# Patient Record
Sex: FEMALE | Race: WHITE | Employment: FULL TIME | URBAN - METROPOLITAN AREA
[De-identification: names, ages, dates, MRNs, and addresses within clinical notes are randomized per-mention and may not be internally consistent; named-entity substitution may affect disease eponyms.]

---

## 2017-05-17 PROBLEM — N95.1 MENOPAUSAL STATE: Status: ACTIVE | Noted: 2017-05-17

## 2018-09-27 ENCOUNTER — APPOINTMENT (OUTPATIENT)
Dept: GENERAL RADIOLOGY | Facility: HOSPITAL | Age: 53
DRG: 690 | End: 2018-09-27
Attending: EMERGENCY MEDICINE
Payer: COMMERCIAL

## 2018-09-27 ENCOUNTER — HOSPITAL ENCOUNTER (INPATIENT)
Facility: HOSPITAL | Age: 53
LOS: 2 days | Discharge: HOME | DRG: 690 | End: 2018-09-30
Attending: EMERGENCY MEDICINE | Admitting: HOSPITALIST
Payer: COMMERCIAL

## 2018-09-27 DIAGNOSIS — N12 PYELONEPHRITIS: Primary | ICD-10-CM

## 2018-09-27 PROCEDURE — 80053 COMPREHEN METABOLIC PANEL: CPT | Performed by: EMERGENCY MEDICINE

## 2018-09-27 PROCEDURE — 93005 ELECTROCARDIOGRAM TRACING: CPT

## 2018-09-27 PROCEDURE — 87086 URINE CULTURE/COLONY COUNT: CPT | Performed by: EMERGENCY MEDICINE

## 2018-09-27 PROCEDURE — 96361 HYDRATE IV INFUSION ADD-ON: CPT

## 2018-09-27 PROCEDURE — 87186 SC STD MICRODIL/AGAR DIL: CPT | Performed by: EMERGENCY MEDICINE

## 2018-09-27 PROCEDURE — 87040 BLOOD CULTURE FOR BACTERIA: CPT | Performed by: EMERGENCY MEDICINE

## 2018-09-27 PROCEDURE — 87088 URINE BACTERIA CULTURE: CPT | Performed by: EMERGENCY MEDICINE

## 2018-09-27 PROCEDURE — 99285 EMERGENCY DEPT VISIT HI MDM: CPT

## 2018-09-27 PROCEDURE — 85025 COMPLETE CBC W/AUTO DIFF WBC: CPT | Performed by: EMERGENCY MEDICINE

## 2018-09-27 PROCEDURE — 71045 X-RAY EXAM CHEST 1 VIEW: CPT | Performed by: EMERGENCY MEDICINE

## 2018-09-27 PROCEDURE — 81001 URINALYSIS AUTO W/SCOPE: CPT | Performed by: EMERGENCY MEDICINE

## 2018-09-27 PROCEDURE — 96365 THER/PROPH/DIAG IV INF INIT: CPT

## 2018-09-27 PROCEDURE — 36415 COLL VENOUS BLD VENIPUNCTURE: CPT

## 2018-09-27 PROCEDURE — 93010 ELECTROCARDIOGRAM REPORT: CPT

## 2018-09-27 PROCEDURE — 83605 ASSAY OF LACTIC ACID: CPT | Performed by: EMERGENCY MEDICINE

## 2018-09-27 RX ORDER — SODIUM CHLORIDE 9 MG/ML
INJECTION, SOLUTION INTRAVENOUS CONTINUOUS
Status: DISCONTINUED | OUTPATIENT
Start: 2018-09-27 | End: 2018-09-30

## 2018-09-27 RX ORDER — ONDANSETRON 2 MG/ML
4 INJECTION INTRAMUSCULAR; INTRAVENOUS EVERY 4 HOURS PRN
Status: DISCONTINUED | OUTPATIENT
Start: 2018-09-27 | End: 2018-09-29

## 2018-09-27 RX ORDER — ACETAMINOPHEN 325 MG/1
650 TABLET ORAL EVERY 6 HOURS PRN
Status: DISCONTINUED | OUTPATIENT
Start: 2018-09-27 | End: 2018-09-29 | Stop reason: DRUGHIGH

## 2018-09-27 RX ORDER — IBUPROFEN 600 MG/1
600 TABLET ORAL ONCE
Status: COMPLETED | OUTPATIENT
Start: 2018-09-27 | End: 2018-09-27

## 2018-09-27 RX ORDER — HEPARIN SODIUM 5000 [USP'U]/ML
5000 INJECTION, SOLUTION INTRAVENOUS; SUBCUTANEOUS EVERY 8 HOURS SCHEDULED
Status: DISCONTINUED | OUTPATIENT
Start: 2018-09-27 | End: 2018-09-30

## 2018-09-27 RX ORDER — SODIUM CHLORIDE 9 MG/ML
INJECTION, SOLUTION INTRAVENOUS CONTINUOUS
Status: ACTIVE | OUTPATIENT
Start: 2018-09-27 | End: 2018-09-27

## 2018-09-27 NOTE — ED PROVIDER NOTES
Patient Seen in: [facility] Emergency Department    History   Patient presents with:  Fever (infectious)    Stated Complaint: fever,neck stiffness, fatigue x 4 days.  Pcp sent pt to ed to r/o josh    HPI    24-year-old female comes the hospital Smoking status: Former Smoker        Packs/day: 1.00        Years: 5.00        Pack years: 5        Quit date: 1988        Years since quittin.7      Smokeless tobacco: Never Used    Alcohol use:  Yes      Alcohol/week: 0.0 - 0.5 oz      Comment: O Creatinine 1.03 (*)     BUN/CREA Ratio 8.7 (*)     AST 13 (*)     All other components within normal limits   CBC W/ DIFFERENTIAL - Abnormal; Notable for the following components:    Neutrophil Absolute Prelim 8.27 (*)     Neutrophil Absolute 8.27 (*) Stopped 9/27/18 1717)   ibuprofen (MOTRIN) tab 600 mg (600 mg Oral Given 9/27/18 1617)           MDM   Patient's workup is consistent with acute pyelonephritis and the patient was placed in the hospital for observation for control of her symptoms and for I

## 2018-09-28 ENCOUNTER — APPOINTMENT (OUTPATIENT)
Dept: CT IMAGING | Facility: HOSPITAL | Age: 53
DRG: 690 | End: 2018-09-28
Attending: PHYSICIAN ASSISTANT
Payer: COMMERCIAL

## 2018-09-28 PROCEDURE — 74176 CT ABD & PELVIS W/O CONTRAST: CPT | Performed by: PHYSICIAN ASSISTANT

## 2018-09-28 PROCEDURE — 87581 M.PNEUMON DNA AMP PROBE: CPT | Performed by: PHYSICIAN ASSISTANT

## 2018-09-28 PROCEDURE — 87798 DETECT AGENT NOS DNA AMP: CPT | Performed by: PHYSICIAN ASSISTANT

## 2018-09-28 PROCEDURE — 80048 BASIC METABOLIC PNL TOTAL CA: CPT | Performed by: HOSPITALIST

## 2018-09-28 PROCEDURE — 87633 RESP VIRUS 12-25 TARGETS: CPT | Performed by: PHYSICIAN ASSISTANT

## 2018-09-28 PROCEDURE — 85025 COMPLETE CBC W/AUTO DIFF WBC: CPT | Performed by: HOSPITALIST

## 2018-09-28 PROCEDURE — 87486 CHLMYD PNEUM DNA AMP PROBE: CPT | Performed by: PHYSICIAN ASSISTANT

## 2018-09-28 PROCEDURE — 87999 UNLISTED MICROBIOLOGY PX: CPT

## 2018-09-28 RX ORDER — ALFUZOSIN HYDROCHLORIDE 10 MG/1
10 TABLET, EXTENDED RELEASE ORAL
Status: DISCONTINUED | OUTPATIENT
Start: 2018-09-29 | End: 2018-09-30

## 2018-09-28 NOTE — PROGRESS NOTES
NURSING ADMISSION NOTE      Patient admitted via Cart  Oriented to room 508  Safety precautions initiated. Bed in low position. Call light in reach. Pt arrived to [facility] from emergency department. Pt A/O x 4, no distress noted.  Admission database com

## 2018-09-28 NOTE — H&P
[facility]  History & Physical    Dakota Perez Patient Status:  Observation    5/15/1965 MRN [id number]   [facility] 5NW-A Attending León Hough MD   Hosp Day # 0 PCP Bunny Narvaez MD     Cc: fever, neck stiffness    History o Comment:  Cystoscopy, Dr. Rosendo Gray  11/11/2013: ESWL LITHOTRIPSY WITH CYSTOSCOPY, [address] [place] [address],[facility] 5474;  Left      Comment:  Performed by Hattie Guillen MD at [facility]  11/11/2013: FRAGMENTING OF KIDNEY STONE      Comment:  Procedure: LITHOTRIPSY WITH C bilaterally  Neurologic: no focal neurological deficits  Musculoskeletal: moves all 4 extremities, gait deferred   Psychiatric: appropriate mood and affect      Data Review:     Recent Labs   Lab  09/27/18   1607  09/28/18   0616   WBC  10.5  6.8   HGB  15 cultures ntd  Obtained CT scan which shows mod hydronephrosis with mod degree of perinephric fat stranding and a small stone at L UPJ  These findings fit with pyelo as suspected in ER last night.   Will cont cefriaxone, f/u cultures and f/u temp curve  Ask

## 2018-09-28 NOTE — PLAN OF CARE
PAIN - ADULT    • Verbalizes/displays adequate comfort level or patient's stated pain goal Progressing        Patient/Family Goals    • Patient/Family Long Term Goal Progressing    • Patient/Family Short Term Goal Progressing        Received patient A/O x4

## 2018-09-28 NOTE — PAYOR COMM NOTE
--------------  ADMISSION REVIEW     Payor: [address]  Subscriber #:  [id number]  Authorization Number: N/A    Admit date: N/A  Admit time: N/A       Admitting Physician: Anastasiya Goode DO  Attending Physician:  Anastasiya Goode DO  Primary Car CALCULUS,SIMPLE      Comment:  Procedure: LITHOTRIPSY WITH CYSTOSCOPY, STENT PLACEMENT;               Surgeon: Candy Ellis MD;  Location: [address] [place] [address]  9/7/10: CYSTOURETHROSCOPY      Comment:  Cystoscopy, Dr. Kimi Ellis  11/11/2013: ESW cyanosis or edema noted.   Full range of motion noted without tenderness  Neuro: No focal deficits noted    ED Course     Labs Reviewed   URINALYSIS WITH CULTURE REFLEX - Abnormal; Notable for the following components:       Result Value    Clarity Urine Prachi Ratel fever,neck stiffness, fatigue x 4 days. Pcp sent pt to ed to r/o menigitis  PATIENT STATED HISTORY: (As transcribed by Technologist)  Patient has a fever for a few days. FINDINGS:  Cardiac size and pulmonary vasculature are within normal limits.  No pleu 9/27/2018 2116 Given 5000 Units Subcutaneous (Left Lower Abdomen) Ida Cash, RN      ibuprofen (MOTRIN) tab 600 mg     Date Action Dose Route User    9/27/2018 1617 Given 600 mg Oral Yahaira Villasenor RN      0.9%  NaCl infusion     Date Action Dose

## 2018-09-28 NOTE — CM/SW NOTE
09/28/18 1200   CM/SW Screening   Referral Source    Information Source Chart review;Nursing rounds   Patient's Mental Status Alert;Oriented   Patient's [address]   Patient lives with Spouse   Patient Status Prior to Admission

## 2018-09-28 NOTE — PLAN OF CARE
GENITOURINARY - ADULT    • Absence of urinary retention Progressing          PAIN - ADULT    • Verbalizes/displays adequate comfort level or patient's stated pain goal Progressing          PROBLEM: Pyelonephritis     ASSESSMENT: Pt is A&O x4. VSS.  Still sp

## 2018-09-29 ENCOUNTER — ANESTHESIA EVENT (OUTPATIENT)
Dept: SURGERY | Facility: HOSPITAL | Age: 53
End: 2018-09-29

## 2018-09-29 ENCOUNTER — APPOINTMENT (OUTPATIENT)
Dept: GENERAL RADIOLOGY | Facility: HOSPITAL | Age: 53
DRG: 690 | End: 2018-09-29
Attending: UROLOGY
Payer: COMMERCIAL

## 2018-09-29 ENCOUNTER — ANESTHESIA (OUTPATIENT)
Dept: SURGERY | Facility: HOSPITAL | Age: 53
End: 2018-09-29

## 2018-09-29 PROCEDURE — 74420 UROGRAPHY RTRGR +-KUB: CPT | Performed by: UROLOGY

## 2018-09-29 PROCEDURE — BT1F0ZZ FLUOROSCOPY OF LEFT KIDNEY, URETER AND BLADDER USING HIGH OSMOLAR CONTRAST: ICD-10-PCS | Performed by: UROLOGY

## 2018-09-29 PROCEDURE — 85025 COMPLETE CBC W/AUTO DIFF WBC: CPT | Performed by: UROLOGY

## 2018-09-29 PROCEDURE — 87086 URINE CULTURE/COLONY COUNT: CPT | Performed by: UROLOGY

## 2018-09-29 PROCEDURE — 0T778DZ DILATION OF LEFT URETER WITH INTRALUMINAL DEVICE, VIA NATURAL OR ARTIFICIAL OPENING ENDOSCOPIC: ICD-10-PCS | Performed by: UROLOGY

## 2018-09-29 DEVICE — STENT URET 6F 26CM WO GW INL: Type: IMPLANTABLE DEVICE | Site: URETER | Status: FUNCTIONAL

## 2018-09-29 RX ORDER — KETOROLAC TROMETHAMINE 30 MG/ML
15 INJECTION, SOLUTION INTRAMUSCULAR; INTRAVENOUS EVERY 6 HOURS PRN
Status: DISCONTINUED | OUTPATIENT
Start: 2018-09-29 | End: 2018-09-30

## 2018-09-29 RX ORDER — HYDROCODONE BITARTRATE AND ACETAMINOPHEN 5; 325 MG/1; MG/1
1 TABLET ORAL AS NEEDED
Status: DISCONTINUED | OUTPATIENT
Start: 2018-09-29 | End: 2018-09-29 | Stop reason: HOSPADM

## 2018-09-29 RX ORDER — KETOROLAC TROMETHAMINE 30 MG/ML
30 INJECTION, SOLUTION INTRAMUSCULAR; INTRAVENOUS EVERY 6 HOURS PRN
Status: DISCONTINUED | OUTPATIENT
Start: 2018-09-29 | End: 2018-09-30

## 2018-09-29 RX ORDER — NALOXONE HYDROCHLORIDE 0.4 MG/ML
80 INJECTION, SOLUTION INTRAMUSCULAR; INTRAVENOUS; SUBCUTANEOUS AS NEEDED
Status: DISCONTINUED | OUTPATIENT
Start: 2018-09-29 | End: 2018-09-29 | Stop reason: HOSPADM

## 2018-09-29 RX ORDER — ACETAMINOPHEN 500 MG
500 TABLET ORAL EVERY 6 HOURS PRN
Status: DISCONTINUED | OUTPATIENT
Start: 2018-09-29 | End: 2018-09-30

## 2018-09-29 RX ORDER — ACETAMINOPHEN 500 MG
1000 TABLET ORAL ONCE AS NEEDED
Status: DISCONTINUED | OUTPATIENT
Start: 2018-09-29 | End: 2018-09-29 | Stop reason: HOSPADM

## 2018-09-29 RX ORDER — HYDROCODONE BITARTRATE AND ACETAMINOPHEN 5; 325 MG/1; MG/1
2 TABLET ORAL AS NEEDED
Status: DISCONTINUED | OUTPATIENT
Start: 2018-09-29 | End: 2018-09-29 | Stop reason: HOSPADM

## 2018-09-29 RX ORDER — SODIUM CHLORIDE, SODIUM LACTATE, POTASSIUM CHLORIDE, CALCIUM CHLORIDE 600; 310; 30; 20 MG/100ML; MG/100ML; MG/100ML; MG/100ML
INJECTION, SOLUTION INTRAVENOUS CONTINUOUS
Status: DISCONTINUED | OUTPATIENT
Start: 2018-09-29 | End: 2018-09-29 | Stop reason: HOSPADM

## 2018-09-29 RX ORDER — MEPERIDINE HYDROCHLORIDE 25 MG/ML
12.5 INJECTION INTRAMUSCULAR; INTRAVENOUS; SUBCUTANEOUS AS NEEDED
Status: DISCONTINUED | OUTPATIENT
Start: 2018-09-29 | End: 2018-09-29 | Stop reason: HOSPADM

## 2018-09-29 RX ORDER — DIPHENHYDRAMINE HYDROCHLORIDE 50 MG/ML
12.5 INJECTION INTRAMUSCULAR; INTRAVENOUS AS NEEDED
Status: DISCONTINUED | OUTPATIENT
Start: 2018-09-29 | End: 2018-09-29 | Stop reason: HOSPADM

## 2018-09-29 RX ORDER — ONDANSETRON 4 MG/1
4 TABLET, ORALLY DISINTEGRATING ORAL EVERY 6 HOURS PRN
Status: DISCONTINUED | OUTPATIENT
Start: 2018-09-29 | End: 2018-09-30

## 2018-09-29 RX ORDER — ONDANSETRON 2 MG/ML
4 INJECTION INTRAMUSCULAR; INTRAVENOUS EVERY 6 HOURS PRN
Status: DISCONTINUED | OUTPATIENT
Start: 2018-09-29 | End: 2018-09-30

## 2018-09-29 RX ORDER — MORPHINE SULFATE 4 MG/ML
2 INJECTION, SOLUTION INTRAMUSCULAR; INTRAVENOUS EVERY 5 MIN PRN
Status: DISCONTINUED | OUTPATIENT
Start: 2018-09-29 | End: 2018-09-29 | Stop reason: HOSPADM

## 2018-09-29 RX ORDER — ONDANSETRON 2 MG/ML
4 INJECTION INTRAMUSCULAR; INTRAVENOUS AS NEEDED
Status: DISCONTINUED | OUTPATIENT
Start: 2018-09-29 | End: 2018-09-29 | Stop reason: HOSPADM

## 2018-09-29 RX ORDER — PHENAZOPYRIDINE HYDROCHLORIDE 200 MG/1
200 TABLET, FILM COATED ORAL 3 TIMES DAILY PRN
Status: DISCONTINUED | OUTPATIENT
Start: 2018-09-29 | End: 2018-09-30

## 2018-09-29 RX ORDER — ACETAMINOPHEN 500 MG
1000 TABLET ORAL EVERY 6 HOURS PRN
Status: DISCONTINUED | OUTPATIENT
Start: 2018-09-29 | End: 2018-09-30

## 2018-09-29 NOTE — ANESTHESIA PREPROCEDURE EVALUATION
PRE-OP EVALUATION    Patient Name: Cruzita Babinski    Pre-op Diagnosis: Pyelonephritis [N12]    Procedure(s):  CYSTOSCOPY LEFT URETERAL STENT PLACEMENT, RETROGRADE    Surgeon(s) and Role:     * Kristine Vann MD - Primary    Pre-op vitals reviewed.   Temp: 98 Surgical History:  No date:   No date: CHOLECYSTECTOMY  2013: CYSTOSCOPY STENT INSERTION;  Left      Comment:  Performed by Scott Neal MD at [facility] MAIN OR  13: CYSTOSCOPY,INSERT URETERAL STENT      Comment:  left ureteral stone and UTI normal.                 Other findings            ASA: 2   Plan: general  NPO status verified and patient meets guidelines. Post-procedure pain management plan discussed with surgeon and patient.     Comment: Discussed with the patient risk of PONV, sore

## 2018-09-29 NOTE — PROGRESS NOTES
Spoke with Dr. Steve Galvin of urology and notified of this new consult, she will round on the patient this morning.

## 2018-09-29 NOTE — PROGRESS NOTES
Pt is alert and oriented. RA lungs clear, pt just returned from her cysto and stent placement. Rocephin IV no tele, VSS, denies pain.  Will monitor

## 2018-09-29 NOTE — OPERATIVE REPORT
[facility]    PATIENT'S NAME: Bere Vyas   ATTENDING PHYSICIAN: Conecpcion Quevedo M.D. OPERATING PHYSICIAN: Edgar Horton M.D.    PATIENT ACCOUNT#:   [de-identified]    LOCATION:  [address] [place] [address]  MEDICAL RECORD #:   [id number]       DATE OF BIRTH:  [birth year] her perivaginal area was prepped and draped in the usual sterile fashion.  imaging by fluoroscopy could not identify the proximal ureteral stone seen on the CT scan. A 21-Singaporean scope was inserted per urethra.   The urethra was normal, without any st

## 2018-09-29 NOTE — H&P (VIEW-ONLY)
[facility]  Report of Consultation    Isabel Bryant Patient Status:  Inpatient    5/15/1965 MRN [id number]   [facility] 5NW-A Attending Christian Heath MD   Hosp Day # 0 PCP Bernie Coulter MD     Reason for Consultation:  [facility]  9/7/10: CYSTOURETHROSCOPY      Comment:  Cystoscopy, Dr. Yudelka Belle  11/11/2013: ESWL LITHOTRIPSY WITH CYSTOSCOPY, [address] [place] [address],[facility] 54;  Left      Comment:  Performed by Abdirashid Dominique MD at [address] [place] [address]  11/11/2013: [facility] 15 dysuria, hematuria, flank pain, abd pain, added urgency/frequency/urinary incontinence despite UTI diagnosis    Physical Exam:  Tired but NAD  /55 (BP Location: Left arm)   Pulse 66   Temp 98.5 °F (36.9 °C) (Oral)   Resp 16   Ht 5' 6\" (1.676 m)   Wt superimposed pyelonephritis cannot be entirely excluded on imaging. Please correlate clinically. 2. Nonobstructing stones involving bilateral inferior pole calices as described above. 3. The bladder wall is slightly thickened concerning for cystitis.   No lithotripsy would be needed after her infection is under better control      Thank you for allowing me to participate in the care of your patient.     Sandra Perez  9/28/2018  10:28 PM

## 2018-09-29 NOTE — ANESTHESIA POSTPROCEDURE EVALUATION
[facility] Patient Status:  Inpatient   Age/Gender 48year old female MRN [id number]   [facility] SURGERY Attending Norma Montiel MD   [facility] Day # 1 PCP Jaime Rivas MD       Anesthesia Post-op Note    Procedure(s):

## 2018-09-29 NOTE — PLAN OF CARE
GENITOURINARY - ADULT    • Absence of urinary retention Progressing        METABOLIC/FLUID AND ELECTROLYTES - ADULT    • Electrolytes maintained within normal limits Progressing    • Hemodynamic stability and optimal renal function maintained Progressing

## 2018-09-29 NOTE — BRIEF OP NOTE
Pre-Operative Diagnosis: Pyelonephritis [N12], left ureteral stone with hydronephrosis     Post-Operative Diagnosis: Pyelonephritis [N12], left ureteral stone with hydronephrosis     Procedure Performed:   Procedure(s):  CYSTOSCOPY LEFT URETERAL STENT PLAC

## 2018-09-29 NOTE — CONSULTS
[facility]  Report of Consultation    Carolin Ibarra Patient Status:  Inpatient    5/15/1965 MRN [id number]   [facility] 5NW-A Attending Renee Horton MD   Hosp Day # 0 PCP Chip Mathews MD     Reason for Consultation:  [facility]  9/7/10: CYSTOURETHROSCOPY      Comment:  Cystoscopy, Dr. Vandana St  11/11/2013: ESWL LITHOTRIPSY WITH CYSTOSCOPY, [address] [place] [address],[facility] 54;  Left      Comment:  Performed by Leonor Doherty MD at [address] [place] [address]  [facility] 15 dysuria, hematuria, flank pain, abd pain, added urgency/frequency/urinary incontinence despite UTI diagnosis    Physical Exam:  Tired but NAD  /55 (BP Location: Left arm)   Pulse 66   Temp 98.5 °F (36.9 °C) (Oral)   Resp 16   Ht 5' 6\" (1.676 m)   Wt superimposed pyelonephritis cannot be entirely excluded on imaging. Please correlate clinically. 2. Nonobstructing stones involving bilateral inferior pole calices as described above. 3. The bladder wall is slightly thickened concerning for cystitis.   No lithotripsy would be needed after her infection is under better control      Thank you for allowing me to participate in the care of your patient.     Trish Perez  9/28/2018  10:28 PM

## 2018-09-29 NOTE — INTERVAL H&P NOTE
Pre-op Diagnosis: Pyelonephritis [N12]    The above referenced H&P was reviewed by Karlene Pollard MD on 9/29/2018, the patient was examined and no significant changes have occurred in the patient's condition since the H&P was performed.   I discussed with the

## 2018-09-30 VITALS
HEIGHT: 66 IN | SYSTOLIC BLOOD PRESSURE: 111 MMHG | WEIGHT: 179.88 LBS | TEMPERATURE: 98 F | RESPIRATION RATE: 18 BRPM | DIASTOLIC BLOOD PRESSURE: 58 MMHG | HEART RATE: 63 BPM | OXYGEN SATURATION: 97 % | BODY MASS INDEX: 28.91 KG/M2

## 2018-09-30 PROBLEM — N13.2 URETERAL STONE WITH HYDRONEPHROSIS: Status: ACTIVE | Noted: 2018-09-30

## 2018-09-30 RX ORDER — PSEUDOEPHEDRINE HCL 30 MG
100 TABLET ORAL 2 TIMES DAILY PRN
Qty: 30 CAPSULE | Refills: 0 | Status: SHIPPED | OUTPATIENT
Start: 2018-09-30 | End: 2018-09-30

## 2018-09-30 RX ORDER — CEPHALEXIN 500 MG/1
500 CAPSULE ORAL 2 TIMES DAILY
Qty: 60 CAPSULE | Refills: 0 | Status: SHIPPED | OUTPATIENT
Start: 2018-09-30 | End: 2018-09-30

## 2018-09-30 RX ORDER — TAMSULOSIN HYDROCHLORIDE 0.4 MG/1
0.4 CAPSULE ORAL DAILY
Qty: 30 CAPSULE | Refills: 0 | Status: SHIPPED | OUTPATIENT
Start: 2018-09-30 | End: 2018-09-30

## 2018-09-30 RX ORDER — CEPHALEXIN 500 MG/1
500 CAPSULE ORAL 3 TIMES DAILY
Qty: 60 CAPSULE | Refills: 0 | Status: SHIPPED | OUTPATIENT
Start: 2018-09-30 | End: 2018-09-30

## 2018-09-30 RX ORDER — ACETAMINOPHEN 500 MG
TABLET ORAL EVERY 6 HOURS PRN
Qty: 30 TABLET | Refills: 0 | Status: SHIPPED | COMMUNITY
Start: 2018-09-30 | End: 2018-10-17 | Stop reason: ALTCHOICE

## 2018-09-30 RX ORDER — DOCUSATE SODIUM 100 MG/1
100 CAPSULE, LIQUID FILLED ORAL 2 TIMES DAILY
Status: DISCONTINUED | OUTPATIENT
Start: 2018-09-30 | End: 2018-09-30

## 2018-09-30 RX ORDER — CEPHALEXIN 500 MG/1
500 CAPSULE ORAL 3 TIMES DAILY
Qty: 60 CAPSULE | Refills: 0 | Status: SHIPPED | OUTPATIENT
Start: 2018-09-30 | End: 2018-10-11

## 2018-09-30 RX ORDER — TAMSULOSIN HYDROCHLORIDE 0.4 MG/1
0.4 CAPSULE ORAL DAILY
Qty: 30 CAPSULE | Refills: 0 | Status: SHIPPED | OUTPATIENT
Start: 2018-09-30 | End: 2018-10-17 | Stop reason: ALTCHOICE

## 2018-09-30 RX ORDER — PHENAZOPYRIDINE HYDROCHLORIDE 200 MG/1
200 TABLET, FILM COATED ORAL 3 TIMES DAILY PRN
Qty: 30 TABLET | Refills: 0 | Status: SHIPPED | OUTPATIENT
Start: 2018-09-30 | End: 2018-10-17 | Stop reason: ALTCHOICE

## 2018-09-30 RX ORDER — PSEUDOEPHEDRINE HCL 30 MG
100 TABLET ORAL 2 TIMES DAILY PRN
Qty: 30 CAPSULE | Refills: 0 | Status: SHIPPED | OUTPATIENT
Start: 2018-09-30 | End: 2018-10-17 | Stop reason: ALTCHOICE

## 2018-09-30 RX ORDER — PHENAZOPYRIDINE HYDROCHLORIDE 200 MG/1
200 TABLET, FILM COATED ORAL 3 TIMES DAILY PRN
Qty: 30 TABLET | Refills: 0 | Status: SHIPPED | OUTPATIENT
Start: 2018-09-30 | End: 2018-09-30

## 2018-09-30 NOTE — PROGRESS NOTES
[facility]  Progress Note    Asa Santos Patient Status:  Inpatient    5/15/1965 MRN [id number]   [facility] 5NW-A Attending Palomo Taylor MD   Hosp Day # 2 PCP Tiana Allison MD     Subjective:  Asa Santos is a(n) 48 year Pyelonephritis      Ureter stone with hydronephrosis s/p ureter stent placement POD1    Plan:  Ok to dc home on oral abx with future FU ureteroscopy in 1-2 weeks  Rx for keflex - reminded patient to remain on treatment abx until stone is treated and stent

## 2018-09-30 NOTE — PROGRESS NOTES
[facility] Hospitalist note    PCP: Nguyễn Barahona MD    Chief Complaint:  F/u uti, upj stone    SUBJECTIVE:  Feeling much better today  Afebrile  No sig pain  ambulatory    OBJECTIVE:  Temp:  [97.6 °F (36.4 °C)-98.9 °F (37.2 °C)] 97.6 °F (36.4 °C)  Pulse: Phenazopyridine HCl, ondansetron **OR** ondansetron HCl, influenza virus vaccine PF       Assessment/Plan:     # Fever, E Coli UTI with Pyelonephritis; UPJ stone  - doing well on ceftriaxone.  Likely can d/c home on keflex based on susceptibilities  - CT ha

## 2018-09-30 NOTE — PROGRESS NOTES
Pt is alert and oriented x 4. Denies pain states with stent she feels like she has a full bladder , so she didn't sleep well because of that feeling. RA lungs clear. Rocephin   Q 24.  IVF infusing,

## 2018-09-30 NOTE — DISCHARGE SUMMARY
General Medicine Discharge Summary     Patient ID:  Melanie Nascimento  48year old  5/15/1965    Admit date: 9/27/2018    Discharge date and time: 9/30/2018  3:08 PM     Attending Physician: No att. provider is contrast in the left intrarenal collecting system and proximal left ureter. 2nd image demonstrates a left ureteral stent in place.      Dictated by: Myra Sam MD on 9/29/2018 at 8:48     Approved by: Myra Sam MD            Ct Abdomen+pelvis K obstruction, or bowel wall thickening. ABDOMINAL WALL:  No mass or hernia. BONES:  No acute fracture. There is extensive disc disease at L4-5 with vacuum disc change. There is bilateral L5 spondylolysis without anterolisthesis.  PELVIC ORGANS:  The blad Procedure(s) (LRB):  CYSTOSCOPY (Left)       Patient instructions:      Discharge Medication List as of 9/30/2018  1:48 PM    START taking these medications    acetaminophen 500 MG Oral Tab  Take 1-2 tablets (500-1,000 mg total) by mouth every 6 (six) hour

## 2018-10-03 NOTE — PAYOR COMM NOTE
--------------  DISCHARGE REVIEW    Payor: [facility]  Subscriber #:  [id number]  Authorization Number: [id number]    Admit date: 9/28/18  Admit time:  1430  Discharge Date: 9/30/2018  3:08 PM     Admitting Physician: DO Mina Hartman per urology, apprec recs    Consults: IP CONSULT TO James Peter    Radiology reports:    Xr Retrograde Pyelogram (cpt=74420)  Result Date: 9/29/2018  CONCLUSION:  Fluoroscopy provided intraoperatively by a technologist.  2 images obtained.   There is contrast i constipation. , Normal, Disp-30 capsule, R-0    cephALEXin (KEFLEX) 500 MG Oral Cap  Take 1 capsule (500 mg total) by mouth 3 (three) times daily for 20 days. , Normal, Disp-60 capsule, R-0    Code Status: Full Code    Exam on day of discharge:      09/30/18

## 2018-10-11 PROCEDURE — 82365 CALCULUS SPECTROSCOPY: CPT | Performed by: UROLOGY

## 2018-10-17 PROCEDURE — 83970 ASSAY OF PARATHORMONE: CPT | Performed by: UROLOGY

## 2018-10-17 PROCEDURE — 84100 ASSAY OF PHOSPHORUS: CPT | Performed by: UROLOGY

## 2018-10-17 PROCEDURE — 82310 ASSAY OF CALCIUM: CPT | Performed by: UROLOGY

## 2018-10-17 PROCEDURE — 82565 ASSAY OF CREATININE: CPT | Performed by: UROLOGY

## 2018-11-12 PROCEDURE — 83945 ASSAY OF OXALATE: CPT | Performed by: UROLOGY

## 2018-11-12 PROCEDURE — 82436 ASSAY OF URINE CHLORIDE: CPT | Performed by: UROLOGY

## 2018-11-12 PROCEDURE — 82507 ASSAY OF CITRATE: CPT | Performed by: UROLOGY

## 2018-11-12 PROCEDURE — 84392 ASSAY OF URINE SULFATE: CPT | Performed by: UROLOGY

## 2018-11-12 PROCEDURE — 81003 URINALYSIS AUTO W/O SCOPE: CPT | Performed by: UROLOGY

## 2018-11-12 PROCEDURE — 82340 ASSAY OF CALCIUM IN URINE: CPT | Performed by: UROLOGY

## 2019-11-06 PROBLEM — S83.512D COMPLETE TEAR OF ANTERIOR CRUCIATE LIGAMENT OF LEFT KNEE, SUBSEQUENT ENCOUNTER: Status: ACTIVE | Noted: 2019-11-06

## 2019-11-22 PROBLEM — M25.562 ACUTE PAIN OF LEFT KNEE: Status: ACTIVE | Noted: 2019-11-22

## 2019-11-22 PROBLEM — S83.512D RUPTURE OF ANTERIOR CRUCIATE LIGAMENT OF LEFT KNEE, SUBSEQUENT ENCOUNTER: Status: ACTIVE | Noted: 2019-11-22

## 2019-12-03 PROBLEM — Z47.89 ORTHOPEDIC AFTERCARE: Status: ACTIVE | Noted: 2019-12-03

## 2022-01-08 ENCOUNTER — APPOINTMENT (OUTPATIENT)
Dept: GENERAL RADIOLOGY | Facility: HOSPITAL | Age: 57
End: 2022-01-08
Attending: EMERGENCY MEDICINE
Payer: COMMERCIAL

## 2022-01-08 ENCOUNTER — HOSPITAL ENCOUNTER (EMERGENCY)
Facility: HOSPITAL | Age: 57
Discharge: HOME | End: 2022-01-08
Payer: COMMERCIAL

## 2022-01-08 ENCOUNTER — APPOINTMENT (OUTPATIENT)
Dept: GENERAL RADIOLOGY | Facility: HOSPITAL | Age: 57
End: 2022-01-08
Payer: COMMERCIAL

## 2022-01-08 VITALS
RESPIRATION RATE: 18 BRPM | HEART RATE: 76 BPM | DIASTOLIC BLOOD PRESSURE: 65 MMHG | SYSTOLIC BLOOD PRESSURE: 140 MMHG | TEMPERATURE: 98 F | OXYGEN SATURATION: 99 %

## 2022-01-08 DIAGNOSIS — S62.102A CLOSED FRACTURE OF LEFT WRIST, INITIAL ENCOUNTER: Primary | ICD-10-CM

## 2022-01-08 PROCEDURE — 73110 X-RAY EXAM OF WRIST: CPT

## 2022-01-08 PROCEDURE — 73100 X-RAY EXAM OF WRIST: CPT | Performed by: EMERGENCY MEDICINE

## 2022-01-08 PROCEDURE — 25605 CLTX DST RDL FX/EPHYS SEP W/: CPT

## 2022-01-08 PROCEDURE — 99285 EMERGENCY DEPT VISIT HI MDM: CPT

## 2022-01-08 PROCEDURE — 96374 THER/PROPH/DIAG INJ IV PUSH: CPT

## 2022-01-08 PROCEDURE — 73030 X-RAY EXAM OF SHOULDER: CPT

## 2022-01-08 PROCEDURE — 96375 TX/PRO/DX INJ NEW DRUG ADDON: CPT

## 2022-01-08 RX ORDER — HYDROCODONE BITARTRATE AND ACETAMINOPHEN 5; 325 MG/1; MG/1
1-2 TABLET ORAL EVERY 6 HOURS PRN
Qty: 20 TABLET | Refills: 0 | Status: SHIPPED | OUTPATIENT
Start: 2022-01-08 | End: 2022-01-18

## 2022-01-08 RX ORDER — HYDROMORPHONE HYDROCHLORIDE 1 MG/ML
1 INJECTION, SOLUTION INTRAMUSCULAR; INTRAVENOUS; SUBCUTANEOUS EVERY 30 MIN PRN
Status: DISCONTINUED | OUTPATIENT
Start: 2022-01-08 | End: 2022-01-09

## 2022-01-08 RX ORDER — MIDAZOLAM HYDROCHLORIDE 1 MG/ML
2 INJECTION INTRAMUSCULAR; INTRAVENOUS ONCE
Status: COMPLETED | OUTPATIENT
Start: 2022-01-08 | End: 2022-01-08

## 2022-01-08 NOTE — ED INITIAL ASSESSMENT (HPI)
Pt here with a positive wrist deformity after falling on the ice. Left wrist deformed. Left shoulder pain also. Ice and sling applied. +CMS. Pt hit her had also. No loc.

## 2022-01-09 NOTE — ED PROVIDER NOTES
Patient Seen in: [facility] Emergency Department      History   Patient presents with:  Arm or Hand Injury    Stated Complaint: Fall, +hit head, denies loc. L wrist pain and L shoulder pain     Subjective:   HPI    Patient had a slip and fall.   She Alcohol/week: 0.0 - 0.8 standard drinks      Comment: OCC    Drug use: No             Review of Systems    Positive for stated complaint: Fall, +hit head, denies loc. L wrist pain and L shoulder pain   Other systems are as noted in HPI.   Constitutional and rotation is demonstrated. Left glenohumeral joint space is well maintained. Minimal hypertrophic changes of the left acromioclavicular joint.    IMPRESSION:   No fracture or dislocation.        A hematoma block was performed with 5 cc lidocaine without Plan     Clinical Impression:  Closed fracture of left wrist, initial encounter  (primary encounter diagnosis)     Disposition:  Discharge  1/8/2022  9:26 pm    Follow-up:  [place], [address] [phone]

## 2022-01-09 NOTE — ED QUICK NOTES
FELL. SUSTAINED PAIN/DEFORMITY TO LEFT WRIST/FOREARM. UNSURE IF SHE HIT HEAD. DENIES LOC. CAP REFILL<2SEC. DENIES NECK PAIN, DENIES ANY OTHER INJURIES.

## 2024-04-10 ENCOUNTER — APPOINTMENT (OUTPATIENT)
Dept: CT IMAGING | Age: 59
End: 2024-04-10
Attending: EMERGENCY MEDICINE
Payer: COMMERCIAL

## 2024-04-10 ENCOUNTER — HOSPITAL ENCOUNTER (EMERGENCY)
Age: 59
Discharge: HOME | End: 2024-04-10
Attending: EMERGENCY MEDICINE
Payer: COMMERCIAL

## 2024-04-10 ENCOUNTER — APPOINTMENT (OUTPATIENT)
Dept: GENERAL RADIOLOGY | Age: 59
End: 2024-04-10
Attending: EMERGENCY MEDICINE
Payer: COMMERCIAL

## 2024-04-10 VITALS
HEART RATE: 67 BPM | RESPIRATION RATE: 18 BRPM | WEIGHT: 210 LBS | DIASTOLIC BLOOD PRESSURE: 69 MMHG | SYSTOLIC BLOOD PRESSURE: 142 MMHG | TEMPERATURE: 100 F | BODY MASS INDEX: 33.75 KG/M2 | HEIGHT: 66 IN | OXYGEN SATURATION: 97 %

## 2024-04-10 DIAGNOSIS — M54.6 ACUTE LEFT-SIDED THORACIC BACK PAIN: Primary | ICD-10-CM

## 2024-04-10 DIAGNOSIS — R07.89 CHEST PAIN, NON-CARDIAC: ICD-10-CM

## 2024-04-10 LAB
ALBUMIN SERPL-MCNC: 3.8 G/DL (ref 3.4–5)
ALBUMIN/GLOB SERPL: 1.2 {RATIO} (ref 1–2)
ALP LIVER SERPL-CCNC: 98 U/L
ALT SERPL-CCNC: 38 U/L
ANION GAP SERPL CALC-SCNC: 3 MMOL/L (ref 0–18)
APTT PPP: 27.5 SECONDS (ref 23.3–35.6)
AST SERPL-CCNC: 21 U/L (ref 15–37)
ATRIAL RATE: 70 BPM
BASOPHILS # BLD AUTO: 0.03 X10(3) UL (ref 0–0.2)
BASOPHILS NFR BLD AUTO: 0.4 %
BILIRUB SERPL-MCNC: 0.3 MG/DL (ref 0.1–2)
BUN BLD-MCNC: 17 MG/DL (ref 9–23)
CALCIUM BLD-MCNC: 8.8 MG/DL (ref 8.5–10.1)
CHLORIDE SERPL-SCNC: 109 MMOL/L (ref 98–112)
CO2 SERPL-SCNC: 26 MMOL/L (ref 21–32)
CREAT BLD-MCNC: 0.88 MG/DL
D DIMER PPP FEU-MCNC: 0.32 UG/ML FEU (ref ?–0.58)
EGFRCR SERPLBLD CKD-EPI 2021: 76 ML/MIN/1.73M2 (ref 60–?)
EOSINOPHIL # BLD AUTO: 0.11 X10(3) UL (ref 0–0.7)
EOSINOPHIL NFR BLD AUTO: 1.5 %
ERYTHROCYTE [DISTWIDTH] IN BLOOD BY AUTOMATED COUNT: 13.1 %
GLOBULIN PLAS-MCNC: 3.3 G/DL (ref 2.8–4.4)
GLUCOSE BLD-MCNC: 121 MG/DL (ref 70–99)
HCT VFR BLD AUTO: 44.1 %
HGB BLD-MCNC: 14.8 G/DL
IMM GRANULOCYTES # BLD AUTO: 0.02 X10(3) UL (ref 0–1)
IMM GRANULOCYTES NFR BLD: 0.3 %
INR BLD: 0.93 (ref 0.8–1.2)
LIPASE SERPL-CCNC: 47 U/L (ref 13–75)
LYMPHOCYTES # BLD AUTO: 1.2 X10(3) UL (ref 1–4)
LYMPHOCYTES NFR BLD AUTO: 16.4 %
MCH RBC QN AUTO: 31 PG (ref 26–34)
MCHC RBC AUTO-ENTMCNC: 33.6 G/DL (ref 31–37)
MCV RBC AUTO: 92.3 FL
MONOCYTES # BLD AUTO: 0.7 X10(3) UL (ref 0.1–1)
MONOCYTES NFR BLD AUTO: 9.6 %
NEUTROPHILS # BLD AUTO: 5.25 X10 (3) UL (ref 1.5–7.7)
NEUTROPHILS # BLD AUTO: 5.25 X10(3) UL (ref 1.5–7.7)
NEUTROPHILS NFR BLD AUTO: 71.8 %
OSMOLALITY SERPL CALC.SUM OF ELEC: 289 MOSM/KG (ref 275–295)
P AXIS: 36 DEGREES
P-R INTERVAL: 154 MS
PLATELET # BLD AUTO: 146 10(3)UL (ref 150–450)
POTASSIUM SERPL-SCNC: 3.9 MMOL/L (ref 3.5–5.1)
PROT SERPL-MCNC: 7.1 G/DL (ref 6.4–8.2)
PROTHROMBIN TIME: 12.5 SECONDS (ref 11.6–14.8)
Q-T INTERVAL: 386 MS
QRS DURATION: 86 MS
QTC CALCULATION (BEZET): 416 MS
R AXIS: 4 DEGREES
RBC # BLD AUTO: 4.78 X10(6)UL
SODIUM SERPL-SCNC: 138 MMOL/L (ref 136–145)
T AXIS: 3 DEGREES
TROPONIN I SERPL HS-MCNC: 6 NG/L
TROPONIN I SERPL HS-MCNC: 9 NG/L
VENTRICULAR RATE: 70 BPM
WBC # BLD AUTO: 7.3 X10(3) UL (ref 4–11)

## 2024-04-10 PROCEDURE — 71045 X-RAY EXAM CHEST 1 VIEW: CPT | Performed by: EMERGENCY MEDICINE

## 2024-04-10 PROCEDURE — 80053 COMPREHEN METABOLIC PANEL: CPT | Performed by: EMERGENCY MEDICINE

## 2024-04-10 PROCEDURE — 85379 FIBRIN DEGRADATION QUANT: CPT | Performed by: EMERGENCY MEDICINE

## 2024-04-10 PROCEDURE — 71275 CT ANGIOGRAPHY CHEST: CPT | Performed by: EMERGENCY MEDICINE

## 2024-04-10 PROCEDURE — 96374 THER/PROPH/DIAG INJ IV PUSH: CPT

## 2024-04-10 PROCEDURE — 85730 THROMBOPLASTIN TIME PARTIAL: CPT | Performed by: EMERGENCY MEDICINE

## 2024-04-10 PROCEDURE — 99285 EMERGENCY DEPT VISIT HI MDM: CPT

## 2024-04-10 PROCEDURE — 74175 CTA ABDOMEN W/CONTRAST: CPT | Performed by: EMERGENCY MEDICINE

## 2024-04-10 PROCEDURE — 83690 ASSAY OF LIPASE: CPT | Performed by: EMERGENCY MEDICINE

## 2024-04-10 PROCEDURE — 85610 PROTHROMBIN TIME: CPT | Performed by: EMERGENCY MEDICINE

## 2024-04-10 PROCEDURE — 84484 ASSAY OF TROPONIN QUANT: CPT | Performed by: EMERGENCY MEDICINE

## 2024-04-10 PROCEDURE — 93005 ELECTROCARDIOGRAM TRACING: CPT

## 2024-04-10 PROCEDURE — 85025 COMPLETE CBC W/AUTO DIFF WBC: CPT | Performed by: EMERGENCY MEDICINE

## 2024-04-10 PROCEDURE — 93010 ELECTROCARDIOGRAM REPORT: CPT

## 2024-04-10 RX ORDER — KETOROLAC TROMETHAMINE 30 MG/ML
30 INJECTION, SOLUTION INTRAMUSCULAR; INTRAVENOUS ONCE
Status: COMPLETED | OUTPATIENT
Start: 2024-04-10 | End: 2024-04-10

## 2024-04-10 RX ORDER — TRAMADOL HYDROCHLORIDE 50 MG/1
50 TABLET ORAL EVERY 8 HOURS PRN
Qty: 10 TABLET | Refills: 0 | Status: SHIPPED | OUTPATIENT
Start: 2024-04-10

## 2024-04-10 NOTE — ED PROVIDER NOTES
Patient Seen in: [facility] In [place]      History     Chief Complaint   Patient presents with    Chest Pain Angina     Stated Complaint: left sided chest pain radiates to back with sob    Subjective:   HPI    Patient is a 58-year-old female presents emergency room with a history of having left upper back pain near her left shoulder blade which started on Monday, 2 days prior to arrival in the emergency room.  The patient's pain is now radiating around to the left side of her chest.  The patient states she saw chiropractor for the symptoms on Monday and admitted relation time with minimal improvement.  The patient denies history of any fall or trauma.  The patient's pain is sharp in nature and is worse with movement or deep breathing.  The patient denies history of any blood clots in the past.  The patient states she once had a \"rib out of place\" in the past.  The patient denies history of any other somatic complaints or discomfort at this time.    Objective:   Past Medical History:    Abnormal uterine bleeding    normal sonohysterogram    Amenorrhea    Anemia    Depression    KIDNEY STONE    MITRAL VALVE PROLAPSE    PONV (postoperative nausea and vomiting)              Past Surgical History:   Procedure Laterality Date          Cholecystectomy      Colonoscopy      Cysto/uretero, stone remove Left 10/11/2018    URS stone extraction stent replacement with string, [facility]    Cystoscopy,insert ureteral stent  13    left ureteral stone and UTI - [facility] [place]    Cystoscopy,insert ureteral stent Left 2018    ureteral stone with hydronephrosis and UTI    Cystoscopy,remv calculus,simple  2013    Procedure: LITHOTRIPSY WITH CYSTOSCOPY, STENT PLACEMENT;  Surgeon: Mike Perez MD;  [place] [facility]    Cystourethroscopy  9/7/10    Cystoscopy, Dr. Perez    Fragmenting of kidney stone  2013    Procedure: LITHOTRIPSY WITH CYSTOSCOPY, STENT PLACEMENT;  Surgeon:  Mike Perez MD;  Location: [facility] [place], [facility]                Social History     Socioeconomic History    Marital status:    Tobacco Use    Smoking status: Former     Current packs/day: 0.00     Average packs/day: 1 pack/day for 5.0 years (5.0 ttl pk-yrs)     Types: Cigarettes     Start date: 1983     Quit date: 1988     Years since quittin.2    Smokeless tobacco: Never   Vaping Use    Vaping status: Never Used   Substance and Sexual Activity    Alcohol use: Yes     Alcohol/week: 0.0 - 0.8 standard drinks of alcohol     Comment: OCC    Drug use: No    Sexual activity: Yes     Partners: Male   Other Topics Concern    Caffeine Concern Yes     Comment: 3 cups    Exercise Yes     Comment: 3 x a week.cardio,weights              Review of Systems    Positive for stated complaint: left sided chest pain radiates to back with sob  Other systems are as noted in HPI.  Constitutional and vital signs reviewed.      All other systems reviewed and negative except as noted above.    Physical Exam     ED Triage Vitals   BP 04/10/24 0822 (!) 161/72   Pulse 04/10/24 0822 81   Resp 04/10/24 0822 21   Temp 04/10/24 0825 99.5 °F (37.5 °C)   Temp src 04/10/24 0825 Temporal   SpO2 04/10/24 0822 98 %   O2 Device 04/10/24 0822 None (Room air)       Current:/69   Pulse 67   Temp 99.5 °F (37.5 °C) (Temporal)   Resp 18   Ht 167.6 cm (5' 6\")   Wt 95.3 kg   LMP 2014   SpO2 97%   BMI 33.89 kg/m²         Physical Exam    GENERAL: Well-developed, well-nourished female sitting up breathing easily in no apparent distress.  Patient is nontoxic in appearance.  HEENT: Head is normocephalic, atraumatic. Pupils are 4 mm equally round and reactive to light. Oropharynx is clear. Mucous membranes are moist.  NECK: There is no focal tenderness to palpation appreciated.   LUNGS: Clear to auscultation bilaterally with no wheeze. There is good equal air entry bilaterally.  HEART: Regular rate and rhythm. Normal S1, S2  no S3, or S4. No murmur.  ABDOMEN: There is no focal tenderness to palpation appreciated anywhere throughout the abdomen. There is no guarding, no rebound, no mass, and no organomegaly appreciated. There is normoactive bowel sounds.  BACK: There is paraspinal tenderness to palpation in the area of the left thoracic area which reproduces the patient's symptoms with no focal midline tenderness palpation throughout the thoracic or lumbar spinous processes.  There is no overlying ecchymosis or rash appreciated.  EXTREMITIES: There is no cyanosis, clubbing, or edema appreciated. Pulses are 2+ and equal in all 4 extremities.  NEURO: Patient is awake, alert and oriented to time place and person. Motor strength is 5 over 5 in all 4 extremities. There are no gross motor or sensory deficits appreciated.  Patient answering all questions appropriately.        ED Course     Labs Reviewed   COMP METABOLIC PANEL (14) - Abnormal; Notable for the following components:       Result Value    Glucose 121 (*)     All other components within normal limits   CBC W/ DIFFERENTIAL - Abnormal; Notable for the following components:    .0 (*)     All other components within normal limits   LIPASE - Normal   TROPONIN I HIGH SENSITIVITY - Normal   D-DIMER - Normal   PROTHROMBIN TIME (PT) - Normal   PTT, ACTIVATED - Normal   TROPONIN I HIGH SENSITIVITY - Normal   CBC WITH DIFFERENTIAL WITH PLATELET    Narrative:     The following orders were created for panel order CBC With Differential With Platelet.  Procedure                               Abnormality         Status                     ---------                               -----------         ------                     CBC W/ DIFFERENTIAL[[id number]]          Abnormal            Final result                 Please view results for these tests on the individual orders.   RAINBOW DRAW LAVENDER   RAINBOW DRAW LIGHT GREEN     EKG    Rate, intervals and axes as noted on EKG Report.  Rate:  70  Rhythm: Sinus Rhythm  Reading: No acute ischemic change noted         I personally interpreted the patient's chest x-ray images and my individual interpretation shows no evidence of any acute infiltrate or pneumothorax.  I also reviewed the official radiology report which showed results as noted below.        CTA CHEST+CTA ABDOMEN DISSECT SET (CPT=71275/86384)    Result Date: 4/10/2024  CONCLUSION:  1. No evidence for an aortic dissection as clinically questioned. 2. Bibasilar atelectasis or early consolidative process, left greater than right, correlate clinically. 3. Bilateral nonobstructing renal calcifications.    LOCATION:  [facility]    Dictated by (CST): Ambreen Gordon MD on 4/10/2024 at 11:26 AM     Finalized by (CST): Ambreen Gordon MD on 4/10/2024 at 11:34 AM       XR CHEST AP PORTABLE  (CPT=71045)    Result Date: 4/10/2024  CONCLUSION:  Mild prominence of interstitial and pulmonary vascular markings in lungs are noted.  This could be technical although mild vascular congestion is not excluded.   LOCATION:  [facility]      Dictated by (CST): Bal Heath MD on 4/10/2024 at 8:51 AM     Finalized by (CST): Bal Heath MD on 4/10/2024 at 8:53 AM               MDM          9:37 patient sitting back and breathing easily in no apparent distress this time.  Patient with no complaints of any chest pain at this time.  Will continue to observe at this time.  10:59 patient sitting back up breathing easily in no apparent distress this time.  Patient denies any chest pain at this time.  Patient still complaining of some discomfort in her left scapular area only at this time.  Awaiting CT results at this time.  Will continue to observe at this time.  12:19 patient sitting back and breathing easily in no apparent distress this time.  Patient with no complaints of any chest pain.  Patient feels comfortable driving herself directly to the cardiology office and knows to go directly the cardiology office for evaluation at this time as  per discussion with Dr. Hurley.  The patient will be seen over at the cardiology office immediately this afternoon.    Patient an IV line established blood work drawn including a CBC, chemistries, BUN and creatinine, and blood sugar all of which are unremarkable.  Liver function test found to be negative.  Troponin and repeat troponin are both negative.  Lipase found to be negative.  D-dimer found to be negative.  Coags are unremarkable.  Patient was placed on a continuous pulse ox and cardiac monitor.  Patient sitting back and breathing easily in no apparent distress.  The patient does have some reproducible tenderness to palpation in the left scapular area.  Patient's pain was initially in the scapular area and then radiated around to the left side of the chest over the last couple of days.  Patient with heart score as noted below.      Heart Score:    HEART Score      Title      Criteria Score   Age: 45-64 Age Score: 1   History: Slightly Suspicious Hx Score: 0     EKG: Non-Spec Changes EKG Score: 1   HTN: No   Hypercholesterolemia: No   Atherosclerosis/PVD: No     DM: No   BMI>30kg/m2: Yes   Smoking: No   Family History: Yes         Other Risk Factor Score: 2             Lab Results   Component Value Date    TROPHS 9 04/10/2024           HEART Score: 3        Risk of adverse cardiac event is 0.9-1.7%        '  Patient with low heart score as noted above.  Patient is sitting back and breathing easily in no apparent distress at this time.  The patient appears comfortable on repeat examination at this time.  I discussed the patient's results with the patient and discussed the case with Dr. Dunn.  I initially was going to send the patient for stress echocardiogram and he stated that he would not like to get a stress echocardiogram at this time and thought the patient would benefit more from a CT gated angiogram.  He wanted the patient discharged from the emergency room and sent to the cardiology clinic and he  has scheduled an appointment for the patient to be seen today at 2 PM in the cardiology clinic to arrange additional testing and evaluation.  Patient feels comfortable with transport to the cardiology clinic at this time and appears well on repeat examination.  The patient will be discharged from the emergency room and sent to cardiology clinic at this time as per discussion with cardiologist.  Patient has been instructed to return to the ER immediately if symptoms worsen or if any other problems arise.  Patient knows to go directly to the cardiologist office for evaluation at this time.                           Medical Decision Making      Disposition and Plan     Clinical Impression:  1. Acute left-sided thoracic back pain    2. Chest pain, non-cardiac         Disposition:  Discharge  4/10/2024 12:16 pm    Follow-up:  Dima Hurley MD  [address] [place] [id number]  [phone]    Go today  Go directly to the cardiology office at this address [address] upon leaving the ER today          Medications Prescribed:  Discharge Medication List as of 4/10/2024 12:20 PM        START taking these medications    Details   traMADol 50 MG Oral Tab Take 1 tablet (50 mg total) by mouth every 8 (eight) hours as needed for Pain., Normal, Disp-10 tablet, R-0

## 2024-04-10 NOTE — DISCHARGE INSTRUCTIONS
Rest at home  Please go directly to the cardiology office today upon leaving the ER for your 2pm appt  Return to the ER if symptoms worsen or if any other problems arise

## 2024-04-10 NOTE — ED INITIAL ASSESSMENT (HPI)
Pt to ed with c/o left scapular pain that started on Monday and has progressively gotten worse, this AM pain started to radiate to her left side of chest, + sob, pain is constant but worse with movement. PT originally thought it was a pulled muscle or displaced rib. Family hx of cardiac issues

## 2024-04-10 NOTE — ED QUICK NOTES
Talked to satya CONTE  at [facility] per MD request. Estimated time or stress at 16:00; MD notified

## 2024-12-19 ENCOUNTER — HOSPITAL ENCOUNTER (OUTPATIENT)
Dept: MAMMOGRAPHY | Age: 59
Discharge: HOME | End: 2024-12-19
Attending: PHYSICIAN ASSISTANT
Payer: COMMERCIAL

## 2024-12-19 DIAGNOSIS — Z12.31 ENCOUNTER FOR SCREENING MAMMOGRAM FOR MALIGNANT NEOPLASM OF BREAST: ICD-10-CM

## 2024-12-19 PROCEDURE — 77063 BREAST TOMOSYNTHESIS BI: CPT | Performed by: PHYSICIAN ASSISTANT

## 2024-12-19 PROCEDURE — 77067 SCR MAMMO BI INCL CAD: CPT | Performed by: PHYSICIAN ASSISTANT

## (undated) DEVICE — KENDALL SCD EXPRESS SLEEVES, KNEE LENGTH, MEDIUM: Brand: KENDALL SCD

## (undated) DEVICE — CAUTERY CORD DISP E0503

## (undated) DEVICE — REM POLYHESIVE ADULT PATIENT RETURN ELECTRODE: Brand: VALLEYLAB

## (undated) DEVICE — GLOVE SURG SENSICARE SZ 6-1/2

## (undated) DEVICE — CYSTO CDS-LF: Brand: MEDLINE INDUSTRIES, INC.

## (undated) NOTE — LETTER
[facility] [address], [address]    Consent for Operation    Date: 9/29/18     Time: 0700    1.  I authorize the performance upon Alison Apley the following operation:    Procedure(s):  CYSTOSCOPY LEFT URETERAL STENT PLACEMENT, videotape. [facility] will not be responsible for storage or maintenance of this tape. 6. For the purpose of advancing medical education, I consent to the admittance of observers to the Operating Room.     7. I authorize the use of any specimen, organs Signature of Patient:   ___________________________    When the patient is a minor or mentally incompetent to give consent:  Signature of person authorized to consent for patient: ___________________________   Relationship to patient: _____________________ drugs/illegal medications). Failure to inform my anesthesiologist about these medicines may increase my risk of anesthetic complications. · If I am allergic to anything or have had a reaction to anesthesia before.     3. I understand how the anesthesia med I have discussed the procedure and information above with the patient (or patient’s representative) and answered their questions. The patient or their representative has agreed to have anesthesia services.     _______________________________________________